# Patient Record
Sex: MALE | Race: WHITE | URBAN - METROPOLITAN AREA
[De-identification: names, ages, dates, MRNs, and addresses within clinical notes are randomized per-mention and may not be internally consistent; named-entity substitution may affect disease eponyms.]

---

## 2020-01-16 ENCOUNTER — APPOINTMENT (RX ONLY)
Dept: URBAN - METROPOLITAN AREA CLINIC 116 | Facility: CLINIC | Age: 70
Setting detail: DERMATOLOGY
End: 2020-01-16

## 2020-01-16 DIAGNOSIS — L82.1 OTHER SEBORRHEIC KERATOSIS: ICD-10-CM

## 2020-01-16 DIAGNOSIS — L81.4 OTHER MELANIN HYPERPIGMENTATION: ICD-10-CM

## 2020-01-16 DIAGNOSIS — D485 NEOPLASM OF UNCERTAIN BEHAVIOR OF SKIN: ICD-10-CM

## 2020-01-16 DIAGNOSIS — D18.0 HEMANGIOMA: ICD-10-CM

## 2020-01-16 DIAGNOSIS — L57.0 ACTINIC KERATOSIS: ICD-10-CM

## 2020-01-16 DIAGNOSIS — D22 MELANOCYTIC NEVI: ICD-10-CM

## 2020-01-16 PROBLEM — D22.5 MELANOCYTIC NEVI OF TRUNK: Status: ACTIVE | Noted: 2020-01-16

## 2020-01-16 PROBLEM — D22.61 MELANOCYTIC NEVI OF RIGHT UPPER LIMB, INCLUDING SHOULDER: Status: ACTIVE | Noted: 2020-01-16

## 2020-01-16 PROBLEM — D22.62 MELANOCYTIC NEVI OF LEFT UPPER LIMB, INCLUDING SHOULDER: Status: ACTIVE | Noted: 2020-01-16

## 2020-01-16 PROBLEM — D18.01 HEMANGIOMA OF SKIN AND SUBCUTANEOUS TISSUE: Status: ACTIVE | Noted: 2020-01-16

## 2020-01-16 PROBLEM — D48.5 NEOPLASM OF UNCERTAIN BEHAVIOR OF SKIN: Status: ACTIVE | Noted: 2020-01-16

## 2020-01-16 PROCEDURE — ? FULL BODY SKIN EXAM - DECLINED

## 2020-01-16 PROCEDURE — ? COUNSELING

## 2020-01-16 PROCEDURE — ? BIOPSY BY SHAVE METHOD

## 2020-01-16 PROCEDURE — 69100 BIOPSY OF EXTERNAL EAR: CPT

## 2020-01-16 PROCEDURE — ? SUNSCREEN RECOMMENDATIONS

## 2020-01-16 PROCEDURE — ? LIQUID NITROGEN

## 2020-01-16 PROCEDURE — 99203 OFFICE O/P NEW LOW 30 MIN: CPT | Mod: 25

## 2020-01-16 PROCEDURE — 17004 DESTROY PREMAL LESIONS 15/>: CPT

## 2020-01-16 ASSESSMENT — LOCATION DETAILED DESCRIPTION DERM
LOCATION DETAILED: RIGHT DISTAL DORSAL FOREARM
LOCATION DETAILED: LEFT PROXIMAL DORSAL FOREARM
LOCATION DETAILED: NASAL SUPRATIP
LOCATION DETAILED: LEFT DISTAL ULNAR DORSAL FOREARM
LOCATION DETAILED: RIGHT ANTITRAGUS
LOCATION DETAILED: LEFT ULNAR DORSAL HAND
LOCATION DETAILED: PERIUMBILICAL SKIN
LOCATION DETAILED: LEFT DISTAL RADIAL DORSAL FOREARM
LOCATION DETAILED: RIGHT RADIAL DORSAL HAND
LOCATION DETAILED: LEFT DISTAL DORSAL FOREARM
LOCATION DETAILED: RIGHT PROXIMAL DORSAL FOREARM
LOCATION DETAILED: LEFT RADIAL DORSAL HAND
LOCATION DETAILED: LEFT TRIANGULAR FOSSA

## 2020-01-16 ASSESSMENT — LOCATION SIMPLE DESCRIPTION DERM
LOCATION SIMPLE: ABDOMEN
LOCATION SIMPLE: RIGHT HAND
LOCATION SIMPLE: RIGHT EAR
LOCATION SIMPLE: RIGHT FOREARM
LOCATION SIMPLE: LEFT EAR
LOCATION SIMPLE: LEFT FOREARM
LOCATION SIMPLE: LEFT HAND
LOCATION SIMPLE: NOSE

## 2020-01-16 ASSESSMENT — LOCATION ZONE DERM
LOCATION ZONE: TRUNK
LOCATION ZONE: EAR
LOCATION ZONE: HAND
LOCATION ZONE: ARM
LOCATION ZONE: NOSE

## 2020-07-16 ENCOUNTER — APPOINTMENT (RX ONLY)
Dept: URBAN - METROPOLITAN AREA CLINIC 116 | Facility: CLINIC | Age: 70
Setting detail: DERMATOLOGY
End: 2020-07-16

## 2020-07-16 VITALS — TEMPERATURE: 97.9 F

## 2020-07-16 DIAGNOSIS — L81.4 OTHER MELANIN HYPERPIGMENTATION: ICD-10-CM

## 2020-07-16 DIAGNOSIS — D22 MELANOCYTIC NEVI: ICD-10-CM

## 2020-07-16 DIAGNOSIS — D485 NEOPLASM OF UNCERTAIN BEHAVIOR OF SKIN: ICD-10-CM

## 2020-07-16 DIAGNOSIS — D18.0 HEMANGIOMA: ICD-10-CM

## 2020-07-16 DIAGNOSIS — L82.1 OTHER SEBORRHEIC KERATOSIS: ICD-10-CM

## 2020-07-16 DIAGNOSIS — L57.0 ACTINIC KERATOSIS: ICD-10-CM

## 2020-07-16 PROBLEM — D18.01 HEMANGIOMA OF SKIN AND SUBCUTANEOUS TISSUE: Status: ACTIVE | Noted: 2020-07-16

## 2020-07-16 PROBLEM — D48.5 NEOPLASM OF UNCERTAIN BEHAVIOR OF SKIN: Status: ACTIVE | Noted: 2020-07-16

## 2020-07-16 PROBLEM — D22.5 MELANOCYTIC NEVI OF TRUNK: Status: ACTIVE | Noted: 2020-07-16

## 2020-07-16 PROCEDURE — ? COUNSELING

## 2020-07-16 PROCEDURE — ? LIQUID NITROGEN

## 2020-07-16 PROCEDURE — 11102 TANGNTL BX SKIN SINGLE LES: CPT

## 2020-07-16 PROCEDURE — ? BIOPSY BY SHAVE METHOD

## 2020-07-16 PROCEDURE — ? FULL BODY SKIN EXAM

## 2020-07-16 PROCEDURE — ? ADDITIONAL NOTES

## 2020-07-16 PROCEDURE — 17000 DESTRUCT PREMALG LESION: CPT | Mod: 59

## 2020-07-16 PROCEDURE — 99213 OFFICE O/P EST LOW 20 MIN: CPT | Mod: 25

## 2020-07-16 PROCEDURE — 17003 DESTRUCT PREMALG LES 2-14: CPT

## 2020-07-16 ASSESSMENT — LOCATION SIMPLE DESCRIPTION DERM
LOCATION SIMPLE: ABDOMEN
LOCATION SIMPLE: RIGHT EAR
LOCATION SIMPLE: LEFT CHEEK
LOCATION SIMPLE: RIGHT UPPER BACK
LOCATION SIMPLE: LEFT EAR

## 2020-07-16 ASSESSMENT — LOCATION DETAILED DESCRIPTION DERM
LOCATION DETAILED: LEFT SUPERIOR HELIX
LOCATION DETAILED: RIGHT MEDIAL UPPER BACK
LOCATION DETAILED: LEFT CENTRAL MALAR CHEEK
LOCATION DETAILED: RIGHT SUPERIOR HELIX
LOCATION DETAILED: LEFT INFERIOR HELIX
LOCATION DETAILED: LEFT TRIANGULAR FOSSA
LOCATION DETAILED: EPIGASTRIC SKIN

## 2020-07-16 ASSESSMENT — LOCATION ZONE DERM
LOCATION ZONE: FACE
LOCATION ZONE: EAR
LOCATION ZONE: TRUNK

## 2020-07-16 NOTE — PROCEDURE: ADDITIONAL NOTES
Detail Level: Simple
Additional Notes: Patient consent was obtained to proceed with the visit and recommended plan of care after discussion of all risks and benefits, including the risks of COVID-19 exposure.
Additional Notes: Biopsy proven seb ker\\nA total of 1 lesion was treated with liquid nitrogen, located on the left triangular fossa. The patient's consent was obtained including but not limited to risks of crusting, scabbing, blistering, scarring, darker or lighter pigmentary change, recurrence, incomplete removal and infection.

## 2020-08-19 ENCOUNTER — APPOINTMENT (RX ONLY)
Dept: URBAN - METROPOLITAN AREA CLINIC 116 | Facility: CLINIC | Age: 70
Setting detail: DERMATOLOGY
End: 2020-08-19

## 2020-08-19 VITALS — TEMPERATURE: 98.2 F

## 2020-08-19 DIAGNOSIS — L57.0 ACTINIC KERATOSIS: ICD-10-CM

## 2020-08-19 DIAGNOSIS — L30.9 DERMATITIS, UNSPECIFIED: ICD-10-CM

## 2020-08-19 PROCEDURE — ? PRESCRIPTION

## 2020-08-19 PROCEDURE — ? LIQUID NITROGEN

## 2020-08-19 PROCEDURE — ? COUNSELING

## 2020-08-19 PROCEDURE — ? PRESCRIPTION MEDICATION MANAGEMENT

## 2020-08-19 PROCEDURE — ? COUNSELING: TOPICAL STEROIDS

## 2020-08-19 PROCEDURE — ? ADDITIONAL NOTES

## 2020-08-19 PROCEDURE — 99212 OFFICE O/P EST SF 10 MIN: CPT | Mod: 25

## 2020-08-19 PROCEDURE — 17000 DESTRUCT PREMALG LESION: CPT

## 2020-08-19 PROCEDURE — ? FULL BODY SKIN EXAM - DECLINED

## 2020-08-19 RX ORDER — KETOCONAZOLE 20 MG/G
CREAM TOPICAL BID
Qty: 1 | Refills: 1 | Status: ERX | COMMUNITY
Start: 2020-08-19

## 2020-08-19 RX ORDER — HYDROCORTISONE 25 MG/G
CREAM TOPICAL BID
Qty: 1 | Refills: 0 | Status: ERX | COMMUNITY
Start: 2020-08-19

## 2020-08-19 RX ADMIN — HYDROCORTISONE: 25 CREAM TOPICAL at 00:00

## 2020-08-19 RX ADMIN — KETOCONAZOLE: 20 CREAM TOPICAL at 00:00

## 2020-08-19 ASSESSMENT — LOCATION DETAILED DESCRIPTION DERM
LOCATION DETAILED: RIGHT AXILLARY VAULT
LOCATION DETAILED: LEFT AXILLARY VAULT
LOCATION DETAILED: LEFT CENTRAL MALAR CHEEK

## 2020-08-19 ASSESSMENT — LOCATION SIMPLE DESCRIPTION DERM
LOCATION SIMPLE: LEFT AXILLARY VAULT
LOCATION SIMPLE: RIGHT AXILLARY VAULT
LOCATION SIMPLE: LEFT CHEEK

## 2020-08-19 ASSESSMENT — LOCATION ZONE DERM
LOCATION ZONE: AXILLAE
LOCATION ZONE: FACE

## 2020-08-19 NOTE — PROCEDURE: ADDITIONAL NOTES
Additional Notes: Biopsy proven AK on left central malar cheek \\nPrevious AK from last visit resolved
Detail Level: Simple
Additional Notes: Patient consent was obtained to proceed with the visit and recommended plan of care after discussion of all risks and benefits, including the risks of COVID-19 exposure.

## 2020-08-19 NOTE — PROCEDURE: PRESCRIPTION MEDICATION MANAGEMENT
Plan: started after using a new soap, change back to original soap
Detail Level: Zone
Render In Strict Bullet Format?: No
Initiate Treatment: HC 2.5% cream and keto cream

## 2020-09-09 ENCOUNTER — APPOINTMENT (RX ONLY)
Dept: URBAN - METROPOLITAN AREA CLINIC 116 | Facility: CLINIC | Age: 70
Setting detail: DERMATOLOGY
End: 2020-09-09

## 2020-09-09 VITALS — TEMPERATURE: 97.3 F

## 2020-09-09 DIAGNOSIS — L57.0 ACTINIC KERATOSIS: ICD-10-CM | Status: RESOLVED

## 2020-09-09 DIAGNOSIS — L30.9 DERMATITIS, UNSPECIFIED: ICD-10-CM | Status: RESOLVED

## 2020-09-09 PROCEDURE — ? COUNSELING: TOPICAL STEROIDS

## 2020-09-09 PROCEDURE — ? FULL BODY SKIN EXAM - DECLINED

## 2020-09-09 PROCEDURE — 99212 OFFICE O/P EST SF 10 MIN: CPT | Mod: 25

## 2020-09-09 PROCEDURE — 17000 DESTRUCT PREMALG LESION: CPT

## 2020-09-09 PROCEDURE — ? LIQUID NITROGEN

## 2020-09-09 PROCEDURE — ? COUNSELING

## 2020-09-09 PROCEDURE — ? ADDITIONAL NOTES

## 2020-09-09 PROCEDURE — ? PRESCRIPTION MEDICATION MANAGEMENT

## 2020-09-09 ASSESSMENT — LOCATION SIMPLE DESCRIPTION DERM
LOCATION SIMPLE: RIGHT ZYGOMA
LOCATION SIMPLE: RIGHT AXILLARY VAULT
LOCATION SIMPLE: LEFT AXILLARY VAULT

## 2020-09-09 ASSESSMENT — LOCATION DETAILED DESCRIPTION DERM
LOCATION DETAILED: RIGHT CENTRAL ZYGOMA
LOCATION DETAILED: RIGHT AXILLARY VAULT
LOCATION DETAILED: LEFT AXILLARY VAULT

## 2020-09-09 ASSESSMENT — LOCATION ZONE DERM
LOCATION ZONE: FACE
LOCATION ZONE: AXILLAE

## 2020-09-09 NOTE — PROCEDURE: PRESCRIPTION MEDICATION MANAGEMENT
Plan: resolved.  Patient changed back to his old soap and no longer having any issues\\nEncouraged him to return if he flares
Detail Level: Zone
Render In Strict Bullet Format?: No
Discontinue Regimen: HC 2.5% cream and keto cream- restart if flares.

## 2020-09-09 NOTE — PROCEDURE: ADDITIONAL NOTES
Additional Notes: Previous AK from last visit resolved
Detail Level: Simple
Additional Notes: Patient consent was obtained to proceed with the visit and recommended plan of care after discussion of all risks and benefits, including the risks of COVID-19 exposure.

## 2021-02-18 ENCOUNTER — APPOINTMENT (RX ONLY)
Dept: URBAN - METROPOLITAN AREA CLINIC 116 | Facility: CLINIC | Age: 71
Setting detail: DERMATOLOGY
End: 2021-02-18

## 2021-02-18 VITALS — TEMPERATURE: 98.3 F

## 2021-02-18 DIAGNOSIS — L57.0 ACTINIC KERATOSIS: ICD-10-CM

## 2021-02-18 DIAGNOSIS — L21.8 OTHER SEBORRHEIC DERMATITIS: ICD-10-CM

## 2021-02-18 DIAGNOSIS — D18.0 HEMANGIOMA: ICD-10-CM

## 2021-02-18 DIAGNOSIS — L57.8 OTHER SKIN CHANGES DUE TO CHRONIC EXPOSURE TO NONIONIZING RADIATION: ICD-10-CM

## 2021-02-18 DIAGNOSIS — R21 RASH AND OTHER NONSPECIFIC SKIN ERUPTION: ICD-10-CM

## 2021-02-18 DIAGNOSIS — D22 MELANOCYTIC NEVI: ICD-10-CM

## 2021-02-18 DIAGNOSIS — L81.4 OTHER MELANIN HYPERPIGMENTATION: ICD-10-CM

## 2021-02-18 DIAGNOSIS — L85.3 XEROSIS CUTIS: ICD-10-CM

## 2021-02-18 DIAGNOSIS — L82.1 OTHER SEBORRHEIC KERATOSIS: ICD-10-CM

## 2021-02-18 DIAGNOSIS — D485 NEOPLASM OF UNCERTAIN BEHAVIOR OF SKIN: ICD-10-CM

## 2021-02-18 PROBLEM — D48.5 NEOPLASM OF UNCERTAIN BEHAVIOR OF SKIN: Status: ACTIVE | Noted: 2021-02-18

## 2021-02-18 PROBLEM — D22.61 MELANOCYTIC NEVI OF RIGHT UPPER LIMB, INCLUDING SHOULDER: Status: ACTIVE | Noted: 2021-02-18

## 2021-02-18 PROBLEM — D18.01 HEMANGIOMA OF SKIN AND SUBCUTANEOUS TISSUE: Status: ACTIVE | Noted: 2021-02-18

## 2021-02-18 PROCEDURE — ? BIOPSY BY PUNCH METHOD

## 2021-02-18 PROCEDURE — ? PRESCRIPTION

## 2021-02-18 PROCEDURE — ? ADDITIONAL NOTES

## 2021-02-18 PROCEDURE — 17003 DESTRUCT PREMALG LES 2-14: CPT

## 2021-02-18 PROCEDURE — ? LIQUID NITROGEN

## 2021-02-18 PROCEDURE — ? COUNSELING

## 2021-02-18 PROCEDURE — 99214 OFFICE O/P EST MOD 30 MIN: CPT | Mod: 25

## 2021-02-18 PROCEDURE — 11104 PUNCH BX SKIN SINGLE LESION: CPT

## 2021-02-18 PROCEDURE — ? TREATMENT REGIMEN

## 2021-02-18 PROCEDURE — 17000 DESTRUCT PREMALG LESION: CPT | Mod: 59

## 2021-02-18 PROCEDURE — ? FULL BODY SKIN EXAM

## 2021-02-18 PROCEDURE — ? SUNSCREEN RECOMMENDATIONS

## 2021-02-18 RX ORDER — KETOCONAZOLE 20 MG/G
CREAM TOPICAL
Qty: 1 | Refills: 2 | Status: ERX

## 2021-02-18 RX ORDER — CLOBETASOL PROPIONATE 0.5 MG/G
OINTMENT TOPICAL
Qty: 1 | Refills: 4 | Status: ERX | COMMUNITY
Start: 2021-02-18

## 2021-02-18 RX ADMIN — CLOBETASOL PROPIONATE: 0.5 OINTMENT TOPICAL at 00:00

## 2021-02-18 ASSESSMENT — LOCATION DETAILED DESCRIPTION DERM
LOCATION DETAILED: RIGHT SUPERIOR MEDIAL FOREHEAD
LOCATION DETAILED: LEFT INFERIOR LATERAL NECK
LOCATION DETAILED: LEFT MEDIAL MALAR CHEEK
LOCATION DETAILED: LEFT SUPERIOR LATERAL UPPER BACK
LOCATION DETAILED: RIGHT PROXIMAL PRETIBIAL REGION
LOCATION DETAILED: LEFT CENTRAL MALAR CHEEK
LOCATION DETAILED: PERIUMBILICAL SKIN
LOCATION DETAILED: RIGHT CENTRAL MALAR CHEEK
LOCATION DETAILED: RIGHT CLAVICULAR NECK
LOCATION DETAILED: LEFT ANTERIOR SHOULDER
LOCATION DETAILED: RIGHT ANTERIOR DISTAL UPPER ARM
LOCATION DETAILED: LEFT VENTRAL DISTAL FOREARM

## 2021-02-18 ASSESSMENT — LOCATION SIMPLE DESCRIPTION DERM
LOCATION SIMPLE: ABDOMEN
LOCATION SIMPLE: RIGHT ANTERIOR NECK
LOCATION SIMPLE: LEFT UPPER BACK
LOCATION SIMPLE: RIGHT UPPER ARM
LOCATION SIMPLE: LEFT CHEEK
LOCATION SIMPLE: LEFT FOREARM
LOCATION SIMPLE: LEFT ANTERIOR NECK
LOCATION SIMPLE: LEFT SHOULDER
LOCATION SIMPLE: RIGHT FOREHEAD
LOCATION SIMPLE: RIGHT CHEEK
LOCATION SIMPLE: RIGHT PRETIBIAL REGION

## 2021-02-18 ASSESSMENT — LOCATION ZONE DERM
LOCATION ZONE: NECK
LOCATION ZONE: LEG
LOCATION ZONE: NECK
LOCATION ZONE: ARM
LOCATION ZONE: TRUNK
LOCATION ZONE: FACE

## 2021-02-18 NOTE — PROCEDURE: BIOPSY BY PUNCH METHOD
Detail Level: Detailed
Was A Bandage Applied: Yes
Punch Size In Mm: 4
Biopsy Type: H and E
Anesthesia Type: 1% lidocaine with epinephrine
Anesthesia Volume In Cc (Will Not Render If 0): 0.5
Additional Anesthesia Volume In Cc (Will Not Render If 0): 0
Hemostasis: None
Epidermal Sutures: 4-0 Ethilon
Wound Care: Petrolatum
Dressing: bandage
Suture Removal: 14 days
Patient Will Remove Sutures At Home?: No
Lab: -[id number]
Lab Facility: [id number]
Consent: Written consent was obtained and risks were reviewed including but not limited to scarring, infection, bleeding, scabbing, incomplete removal, nerve damage and allergy to anesthesia.
Post-Care Instructions: I reviewed with the patient in detail post-care instructions. Patient is to keep the biopsy site dry overnight, and then apply bacitracin twice daily until healed. Patient may apply hydrogen peroxide soaks to remove any crusting.
Home Suture Removal Text: Patient was provided a home suture removal kit and will remove their sutures at home.  If they have any questions or difficulties they will call the office.
Notification Instructions: Patient will be notified of biopsy results. However, patient instructed to call the office if not contacted within 2 weeks.
Billing Type: Third-Party Bill
Information: Selecting Yes will display possible errors in your note based on the variables you have selected. This validation is only offered as a suggestion for you. PLEASE NOTE THAT THE VALIDATION TEXT WILL BE REMOVED WHEN YOU FINALIZE YOUR NOTE. IF YOU WANT TO FAX A PRELIMINARY NOTE YOU WILL NEED TO TOGGLE THIS TO 'NO' IF YOU DO NOT WANT IT IN YOUR FAXED NOTE.

## 2021-03-04 ENCOUNTER — APPOINTMENT (RX ONLY)
Dept: URBAN - METROPOLITAN AREA CLINIC 116 | Facility: CLINIC | Age: 71
Setting detail: DERMATOLOGY
End: 2021-03-04

## 2021-03-04 VITALS — TEMPERATURE: 98 F

## 2021-03-04 DIAGNOSIS — Z48.02 ENCOUNTER FOR REMOVAL OF SUTURES: ICD-10-CM

## 2021-03-04 DIAGNOSIS — L21.8 OTHER SEBORRHEIC DERMATITIS: ICD-10-CM | Status: INADEQUATELY CONTROLLED

## 2021-03-04 DIAGNOSIS — R21 RASH AND OTHER NONSPECIFIC SKIN ERUPTION: ICD-10-CM | Status: RESOLVED

## 2021-03-04 PROCEDURE — ? SUTURE REMOVAL (GLOBAL PERIOD)

## 2021-03-04 PROCEDURE — 99024 POSTOP FOLLOW-UP VISIT: CPT

## 2021-03-04 PROCEDURE — 99214 OFFICE O/P EST MOD 30 MIN: CPT

## 2021-03-04 PROCEDURE — ? PRESCRIPTION

## 2021-03-04 PROCEDURE — ? ADDITIONAL NOTES

## 2021-03-04 PROCEDURE — ? FULL BODY SKIN EXAM - DECLINED

## 2021-03-04 PROCEDURE — ? COUNSELING

## 2021-03-04 PROCEDURE — ? PRESCRIPTION MEDICATION MANAGEMENT

## 2021-03-04 RX ORDER — HYDROCORTISONE 25 MG/G
CREAM TOPICAL BID
Qty: 1 | Refills: 1 | Status: ERX

## 2021-03-04 ASSESSMENT — LOCATION DETAILED DESCRIPTION DERM
LOCATION DETAILED: LEFT CENTRAL MALAR CHEEK
LOCATION DETAILED: LEFT ANTERIOR SHOULDER
LOCATION DETAILED: RIGHT CENTRAL MALAR CHEEK

## 2021-03-04 ASSESSMENT — LOCATION SIMPLE DESCRIPTION DERM
LOCATION SIMPLE: LEFT SHOULDER
LOCATION SIMPLE: RIGHT CHEEK
LOCATION SIMPLE: LEFT CHEEK

## 2021-03-04 ASSESSMENT — LOCATION ZONE DERM
LOCATION ZONE: FACE
LOCATION ZONE: ARM

## 2021-03-04 NOTE — PROCEDURE: PRESCRIPTION MEDICATION MANAGEMENT
Render In Strict Bullet Format?: No
Detail Level: Zone
Initiate Treatment: Hydrocortisone\\nKetoconazole cream
Discontinue Regimen: Clobetasol cream - patient did not use
Samples Given: Cerave moisturizing cream
Plan: Patient reports that rash resolved without using treatment because he was unsure where to apply creams. \\nDiscussed results of biopsy showing eczematous dermatitis.

## 2021-03-04 NOTE — PROCEDURE: SUTURE REMOVAL (GLOBAL PERIOD)
Detail Level: Detailed
Add 32872 Cpt? (Important Note: In 2017 The Use Of 19926 Is Being Tracked By Cms To Determine Future Global Period Reimbursement For Global Periods): yes